# Patient Record
Sex: MALE | Race: ASIAN
[De-identification: names, ages, dates, MRNs, and addresses within clinical notes are randomized per-mention and may not be internally consistent; named-entity substitution may affect disease eponyms.]

---

## 2019-11-27 ENCOUNTER — HOSPITAL ENCOUNTER (INPATIENT)
Dept: HOSPITAL 4 - SED | Age: 54
LOS: 1 days | Discharge: HOME | DRG: 392 | End: 2019-11-28
Attending: STUDENT IN AN ORGANIZED HEALTH CARE EDUCATION/TRAINING PROGRAM | Admitting: STUDENT IN AN ORGANIZED HEALTH CARE EDUCATION/TRAINING PROGRAM
Payer: COMMERCIAL

## 2019-11-27 VITALS — SYSTOLIC BLOOD PRESSURE: 140 MMHG

## 2019-11-27 VITALS — SYSTOLIC BLOOD PRESSURE: 109 MMHG

## 2019-11-27 VITALS — WEIGHT: 151.19 LBS | BODY MASS INDEX: 22.39 KG/M2 | HEIGHT: 69 IN

## 2019-11-27 DIAGNOSIS — Z79.899: ICD-10-CM

## 2019-11-27 DIAGNOSIS — K31.9: ICD-10-CM

## 2019-11-27 DIAGNOSIS — E78.5: ICD-10-CM

## 2019-11-27 DIAGNOSIS — E02: ICD-10-CM

## 2019-11-27 DIAGNOSIS — K21.9: Primary | ICD-10-CM

## 2019-11-27 DIAGNOSIS — K29.70: ICD-10-CM

## 2019-11-27 DIAGNOSIS — R00.1: ICD-10-CM

## 2019-11-27 DIAGNOSIS — E11.9: ICD-10-CM

## 2019-11-27 LAB
ALBUMIN SERPL BCP-MCNC: 4.4 G/DL (ref 3.4–4.8)
ALT SERPL W P-5'-P-CCNC: 59 U/L (ref 12–78)
ANION GAP SERPL CALCULATED.3IONS-SCNC: < 3 MMOL/L (ref 5–15)
AST SERPL W P-5'-P-CCNC: 38 U/L (ref 10–37)
BASOPHILS # BLD AUTO: 0.1 K/UL (ref 0–0.2)
BASOPHILS NFR BLD AUTO: 1.1 % (ref 0–2)
BILIRUB SERPL-MCNC: 0.8 MG/DL (ref 0–1)
BUN SERPL-MCNC: 8 MG/DL (ref 8–21)
CALCIUM SERPL-MCNC: 8.8 MG/DL (ref 8.4–11)
CHLORIDE SERPL-SCNC: 98 MMOL/L (ref 98–107)
CREAT SERPL-MCNC: 0.92 MG/DL (ref 0.55–1.3)
EOSINOPHIL # BLD AUTO: 0.2 K/UL (ref 0–0.4)
EOSINOPHIL NFR BLD AUTO: 3 % (ref 0–4)
ERYTHROCYTE [DISTWIDTH] IN BLOOD BY AUTOMATED COUNT: 13.5 % (ref 9–15)
GFR SERPL CREATININE-BSD FRML MDRD: 110 ML/MIN (ref 90–?)
GLUCOSE SERPL-MCNC: 128 MG/DL (ref 70–99)
HCT VFR BLD AUTO: 41.7 % (ref 36–54)
HGB BLD-MCNC: 14.7 G/DL (ref 14–18)
LIPASE SERPL-CCNC: 200 U/L (ref 73–393)
LYMPHOCYTES # BLD AUTO: 3.3 K/UL (ref 1–5.5)
LYMPHOCYTES NFR BLD AUTO: 48.2 % (ref 20.5–51.5)
MCH RBC QN AUTO: 32 PG (ref 27–31)
MCHC RBC AUTO-ENTMCNC: 35 % (ref 32–36)
MCV RBC AUTO: 91 FL (ref 79–98)
MONOCYTES # BLD MANUAL: 0.4 K/UL (ref 0–1)
MONOCYTES # BLD MANUAL: 6.4 % (ref 1.7–9.3)
NEUTROPHILS # BLD AUTO: 2.8 K/UL (ref 1.8–7.7)
NEUTROPHILS NFR BLD AUTO: 41.3 % (ref 40–70)
PLATELET # BLD AUTO: 306 K/UL (ref 130–430)
POTASSIUM SERPL-SCNC: 4.1 MMOL/L (ref 3.5–5.1)
RBC # BLD AUTO: 4.6 MIL/UL (ref 4.2–6.2)
SODIUM SERPLBLD-SCNC: 133 MMOL/L (ref 136–145)
WBC # BLD AUTO: 6.9 K/UL (ref 4.8–10.8)

## 2019-11-27 PROCEDURE — G0378 HOSPITAL OBSERVATION PER HR: HCPCS

## 2019-11-27 RX ADMIN — HEPARIN SODIUM SCH UNITS: 5000 INJECTION, SOLUTION INTRAVENOUS; SUBCUTANEOUS at 21:24

## 2019-11-27 NOTE — NUR
Patient will be admitted to [facility].  Admitted to Teleemtry unit.  
Will go to room 124A.  Complete and up to date summary report printed. SBAR 
report to be given at bedside with opportunity for questions.

## 2019-11-27 NOTE — NUR
Patient triaged and placed in waiting room. Patient appears in no acute 
distress at this time. Accompanied by wife, awaiting available bed, and MD 
notified of need for MSE.

## 2019-11-27 NOTE — NUR
Pt AAOx4 ambulated into ED c/o epigastric discomfort and nausea x 3 days 
everyday after 4pm and after eating. Denies vomiting/diarrhea/chest pain/sob. 
Skin pink dry and warm, breathing even and unlabored. No other 
injuries/complaints per pt/noted. Will continue to monitor.

## 2019-11-27 NOTE — NUR
Admission Note

Received patient from ER with diagnosis of SYMTOMATIC ELDER CARDIA . Initial Plan of Care 
discussed-patient verbalized understanding. Family at bedside. Oriented to room, call light, 
pain management and safety.

## 2019-11-27 NOTE — NUR
Placed in room 02  . Placed on cardiac monitor, blood pressure machine and 
pulse oximeter. To gown for exam. Side rails up.

## 2019-11-28 VITALS — SYSTOLIC BLOOD PRESSURE: 121 MMHG

## 2019-11-28 VITALS — SYSTOLIC BLOOD PRESSURE: 116 MMHG

## 2019-11-28 LAB
AMPHETAMINES UR QL SCN: NEGATIVE
ANION GAP SERPL CALCULATED.3IONS-SCNC: 5 MMOL/L (ref 5–15)
APPEARANCE UR: CLEAR
BACTERIA URNS QL MICRO: (no result) /HPF
BARBITURATES UR QL SCN: NEGATIVE
BASOPHILS # BLD AUTO: 0 K/UL (ref 0–0.2)
BASOPHILS NFR BLD AUTO: 0.2 % (ref 0–2)
BENZODIAZ UR QL SCN: NEGATIVE
BILIRUB UR QL STRIP: NEGATIVE
BUN SERPL-MCNC: 10 MG/DL (ref 8–21)
BZE UR QL SCN: NEGATIVE
CALCIUM SERPL-MCNC: 8.9 MG/DL (ref 8.4–11)
CANNABINOIDS UR QL SCN: NEGATIVE
CHLORIDE SERPL-SCNC: 100 MMOL/L (ref 98–107)
CHOLEST SERPL-MCNC: 134 MG/DL (ref ?–200)
COLOR UR: YELLOW
CREAT SERPL-MCNC: 0.88 MG/DL (ref 0.55–1.3)
EOSINOPHIL # BLD AUTO: 0.3 K/UL (ref 0–0.4)
EOSINOPHIL NFR BLD AUTO: 4.4 % (ref 0–4)
ERYTHROCYTE [DISTWIDTH] IN BLOOD BY AUTOMATED COUNT: 13.5 % (ref 9–15)
GFR SERPL CREATININE-BSD FRML MDRD: 116 ML/MIN (ref 90–?)
GLUCOSE SERPL-MCNC: 158 MG/DL (ref 70–99)
GLUCOSE UR STRIP-MCNC: (no result) MG/DL
HCT VFR BLD AUTO: 43 % (ref 36–54)
HDLC SERPL-MCNC: 36 MG/DL (ref 45–?)
HGB BLD-MCNC: 15.1 G/DL (ref 14–18)
HGB UR QL STRIP: NEGATIVE
INR PPP: 1.1 (ref 0.8–1.2)
KETONES UR STRIP-MCNC: NEGATIVE MG/DL
LDL CHOLESTEROL: 71 MG/DL (ref ?–100)
LEUKOCYTE ESTERASE UR QL STRIP: NEGATIVE
LYMPHOCYTES # BLD AUTO: 2.7 K/UL (ref 1–5.5)
LYMPHOCYTES NFR BLD AUTO: 46.2 % (ref 20.5–51.5)
MCH RBC QN AUTO: 32 PG (ref 27–31)
MCHC RBC AUTO-ENTMCNC: 35 % (ref 32–36)
MCV RBC AUTO: 91 FL (ref 79–98)
METHADONE UR-SCNC: NEGATIVE UMOL/L
METHAMPHET UR-SCNC: NEGATIVE UMOL/L
MONOCYTES # BLD MANUAL: 0.3 K/UL (ref 0–1)
MONOCYTES # BLD MANUAL: 5.7 % (ref 1.7–9.3)
NEUTROPHILS # BLD AUTO: 2.5 K/UL (ref 1.8–7.7)
NEUTROPHILS NFR BLD AUTO: 43.5 % (ref 40–70)
NITRITE UR QL STRIP: POSITIVE
OPIATES UR QL SCN: NEGATIVE
OXYCODONE SERPL-MCNC: NEGATIVE NG/ML
PCP UR QL SCN: NEGATIVE
PH UR STRIP: 7.5 [PH] (ref 5–8)
PHOSPHATE SERPL-MCNC: 3.7 MG/DL (ref 2.7–4.5)
PLATELET # BLD AUTO: 276 K/UL (ref 130–430)
POTASSIUM SERPL-SCNC: 4.2 MMOL/L (ref 3.5–5.1)
PROT UR QL STRIP: (no result)
PROTHROMBIN TIME: 10.9 SECS (ref 9.5–12.5)
RBC # BLD AUTO: 4.74 MIL/UL (ref 4.2–6.2)
RBC #/AREA URNS HPF: (no result) /HPF (ref 0–3)
SODIUM SERPLBLD-SCNC: 138 MMOL/L (ref 136–145)
SP GR UR STRIP: 1.01 (ref 1–1.03)
T4 FREE SERPL-MCNC: 1 NG/DL (ref 0.8–1.5)
TRICYCLICS UR-MCNC: NEGATIVE NG/ML
TRIGL SERPL-MCNC: 238 MG/DL (ref 30–150)
TSH SERPL DL<=0.05 MIU/L-ACNC: 7.02 UIU/ML (ref 0.36–3.74)
URINE PROPOXYPHENE SCREEN: NEGATIVE
UROBILINOGEN UR STRIP-MCNC: 1 MG/DL (ref 0.2–1)
WBC # BLD AUTO: 5.7 K/UL (ref 4.8–10.8)
WBC #/AREA URNS HPF: (no result) /HPF (ref 0–3)

## 2019-11-28 RX ADMIN — LISINOPRIL SCH MG: 5 TABLET ORAL at 09:00

## 2019-11-28 RX ADMIN — ATORVASTATIN CALCIUM SCH MG: 20 TABLET, FILM COATED ORAL at 09:29

## 2019-11-28 RX ADMIN — HEPARIN SODIUM SCH UNITS: 5000 INJECTION, SOLUTION INTRAVENOUS; SUBCUTANEOUS at 09:31

## 2019-11-28 RX ADMIN — PANTOPRAZOLE SODIUM SCH MG: 40 TABLET, DELAYED RELEASE ORAL at 09:29

## 2019-11-28 RX ADMIN — LISINOPRIL SCH MG: 5 TABLET ORAL at 09:29

## 2019-11-28 RX ADMIN — ATORVASTATIN CALCIUM SCH MG: 20 TABLET, FILM COATED ORAL at 09:00

## 2019-11-28 RX ADMIN — PANTOPRAZOLE SODIUM SCH MG: 40 TABLET, DELAYED RELEASE ORAL at 09:00

## 2019-11-28 NOTE — NUR
Hourly Rounding 

 patient Resting is verbally Responsive call bell with patient Family also @ the bedside .

## 2019-11-28 NOTE — NUR
CONSULTATION PAGED/CALLED



Reason for Consultation: SYMPTOMATIC BRADYCARDIA

Person Who was Notified: STEPHANIE

Consulting Physician: DR. WATERS

Ordering Physician: DR. HASTINGS

## 2019-11-28 NOTE — NUR
CONSULTATION PAGED/CALLED

Reason for Consultation: []  EPIGASTRIC PAIN

Person Who was Notified: []  VANI

Consulting Physician: []  DR MONAHAN

Consultant Specialty: []  GI

Ordering Physician: []  DR HASTINGS

## 2019-11-28 NOTE — NUR
Dr Enoc stapleton

came to see patient for consult, per Dr Stubbs, ping patient NPO and hold meds for now, 
he wants to talk to primary doctor to see if any other tests should be done while he is 
still NPO, he explained this to the patient and his wife and they verbalized understanding.

-------------------------------------------------------------------------------

Addendum: 11/28/19 at 0958 by Eulalia Boles RN

-------------------------------------------------------------------------------

Dr Nieto, asked her about NPO status, she said it was okay to still give PO medications 
to patient.

-------------------------------------------------------------------------------

Addendum: 11/28/19 at 1004 by Eulalia Boles RN

-------------------------------------------------------------------------------

I talked to the patient and informed him that Dr Nieto said he can still take his PO 
medications, patient stated that he will hold off on them at this time and does not want to 
take them until his NPO status is taken off.

## 2019-11-28 NOTE — NUR
opening note

patient is resting in bed, wife at the bedside, alert and oriented, no signs of distress at 
this time, educated on call light system and plan of care, patient verbalized understanding, 
no other needs addressed at this time, fall/safety precautions in place, patient NPO at this 
time for scheduled abdominal ultrasound.